# Patient Record
Sex: MALE | Race: WHITE | URBAN - METROPOLITAN AREA
[De-identification: names, ages, dates, MRNs, and addresses within clinical notes are randomized per-mention and may not be internally consistent; named-entity substitution may affect disease eponyms.]

---

## 2018-10-11 ENCOUNTER — APPOINTMENT (RX ONLY)
Dept: URBAN - METROPOLITAN AREA CLINIC 61 | Facility: CLINIC | Age: 43
Setting detail: DERMATOLOGY
End: 2018-10-11

## 2018-10-11 DIAGNOSIS — L71.8 OTHER ROSACEA: ICD-10-CM

## 2018-10-11 PROBLEM — L70.0 ACNE VULGARIS: Status: ACTIVE | Noted: 2018-10-11

## 2018-10-11 PROCEDURE — ? COUNSELING

## 2018-10-11 PROCEDURE — 99202 OFFICE O/P NEW SF 15 MIN: CPT

## 2018-10-11 PROCEDURE — ? REFUSAL OF TREATMENT

## 2018-10-11 PROCEDURE — ? OTHER

## 2018-10-11 ASSESSMENT — SEVERITY ASSESSMENT OVERALL AMONG ALL PATIENTS
IN YOUR EXPERIENCE, AMONG ALL PATIENTS YOU HAVE SEEN WITH THIS CONDITION, HOW SEVERE IS THIS PATIENT'S CONDITION?: MILD TO MODERATE

## 2018-10-11 ASSESSMENT — LOCATION SIMPLE DESCRIPTION DERM: LOCATION SIMPLE: LEFT CHEEK

## 2018-10-11 ASSESSMENT — LOCATION DETAILED DESCRIPTION DERM: LOCATION DETAILED: LEFT CENTRAL MALAR CHEEK

## 2018-10-11 ASSESSMENT — LOCATION ZONE DERM: LOCATION ZONE: FACE

## 2018-10-11 NOTE — PROCEDURE: OTHER
Other (Free Text): Discussed starting Soolantra, Doxycycline, and Sulfa Cleanse
Note Text (......Xxx Chief Complaint.): This diagnosis correlates with the
Detail Level: Zone